# Patient Record
Sex: FEMALE | Race: WHITE | NOT HISPANIC OR LATINO | URBAN - METROPOLITAN AREA
[De-identification: names, ages, dates, MRNs, and addresses within clinical notes are randomized per-mention and may not be internally consistent; named-entity substitution may affect disease eponyms.]

---
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
## 2018-05-09 ENCOUNTER — IMPORTED ENCOUNTER (OUTPATIENT)
Dept: URBAN - METROPOLITAN AREA CLINIC 31 | Facility: CLINIC | Age: 18
End: 2018-05-09

## 2018-05-09 PROCEDURE — 92015 DETERMINE REFRACTIVE STATE: CPT

## 2018-05-09 PROCEDURE — 92014 COMPRE OPH EXAM EST PT 1/>: CPT

## 2022-04-02 ASSESSMENT — VISUAL ACUITY
OS_CC: J1+14''
OS_SC: 20/25
OD_SC: 20/25
OD_SC: 20/20
OS_SC: 20/20-1
OD_CC: J1+14''

## 2022-04-02 ASSESSMENT — TONOMETRY
OS_IOP_MMHG: 15
OD_IOP_MMHG: 15
OS_IOP_MMHG: 16
OD_IOP_MMHG: 16